# Patient Record
Sex: MALE | Race: OTHER | HISPANIC OR LATINO | URBAN - METROPOLITAN AREA
[De-identification: names, ages, dates, MRNs, and addresses within clinical notes are randomized per-mention and may not be internally consistent; named-entity substitution may affect disease eponyms.]

---

## 2019-07-30 ENCOUNTER — EMERGENCY (EMERGENCY)
Facility: HOSPITAL | Age: 24
LOS: 1 days | Discharge: HOME | End: 2019-07-30
Attending: EMERGENCY MEDICINE
Payer: MEDICAID

## 2019-07-30 VITALS
RESPIRATION RATE: 18 BRPM | WEIGHT: 218.04 LBS | OXYGEN SATURATION: 98 % | DIASTOLIC BLOOD PRESSURE: 89 MMHG | SYSTOLIC BLOOD PRESSURE: 139 MMHG | HEART RATE: 77 BPM | HEIGHT: 73 IN | TEMPERATURE: 98 F

## 2019-07-30 VITALS
SYSTOLIC BLOOD PRESSURE: 143 MMHG | DIASTOLIC BLOOD PRESSURE: 87 MMHG | OXYGEN SATURATION: 97 % | TEMPERATURE: 98 F | HEART RATE: 70 BPM | RESPIRATION RATE: 18 BRPM

## 2019-07-30 PROCEDURE — 93010 ELECTROCARDIOGRAM REPORT: CPT

## 2019-07-30 PROCEDURE — 99284 EMERGENCY DEPT VISIT MOD MDM: CPT

## 2019-07-30 PROCEDURE — 93005 ELECTROCARDIOGRAM TRACING: CPT

## 2019-07-30 PROCEDURE — 71046 X-RAY EXAM CHEST 2 VIEWS: CPT

## 2019-07-30 PROCEDURE — 71046 X-RAY EXAM CHEST 2 VIEWS: CPT | Mod: 26

## 2019-07-30 PROCEDURE — 99283 EMERGENCY DEPT VISIT LOW MDM: CPT

## 2019-07-30 RX ORDER — IBUPROFEN 200 MG
600 TABLET ORAL ONCE
Refills: 0 | Status: COMPLETED | OUTPATIENT
Start: 2019-07-30 | End: 2019-07-30

## 2019-07-30 RX ORDER — ACETAMINOPHEN 500 MG
650 TABLET ORAL ONCE
Refills: 0 | Status: COMPLETED | OUTPATIENT
Start: 2019-07-30 | End: 2019-07-30

## 2019-07-30 RX ADMIN — Medication 650 MILLIGRAM(S): at 22:07

## 2019-07-30 RX ADMIN — Medication 600 MILLIGRAM(S): at 22:07

## 2019-07-30 NOTE — ED PROVIDER NOTE - SKIN, MLM
Skin normal color for race, warm, dry and intact. No evidence of rash. No swelling in the extremities.

## 2019-07-30 NOTE — ED PROVIDER NOTE - NSFOLLOWUPINSTRUCTIONS_ED_ALL_ED_FT
You were seen in the ED for chest pain. Your chest x-ray and EKG (heart test) did not reveal any concerning abnormalities.     It is not entirely clear what is causing your symptoms, however it does not appear to be anything immediately dangerous.     You may use Tylenol 650mg every 8 hours or Motrin 600mg every 8 hours as needed for pain. These are over the counter medications.     Return to the ED for worsening pain, trouble breathing or any other concerns.

## 2019-07-30 NOTE — ED ADULT NURSE NOTE - OBJECTIVE STATEMENT
Pt presents to ED with complaint of left sided chest soreness, radiating down to the left arm,  pt began at 6pm yesterday, squeezing in quality, precipitated with movement and breathing, reports subjective fever at home, breathing unlabored, symmetrical, no shortness of breath, no cough, no nausea vomiting or diarrhea.

## 2019-07-30 NOTE — ED PROVIDER NOTE - ATTENDING CONTRIBUTION TO CARE
Attending MD Lopez:  I personally have seen and examined this patient.  Resident note reviewed and agree on plan of care and except where noted.  See HPI, PE, and MDM for details.

## 2019-07-30 NOTE — ED PROVIDER NOTE - CLINICAL SUMMARY MEDICAL DECISION MAKING FREE TEXT BOX
Attending MD Lopez: 23M with left chest wall pain. EKG without acute ischemic changes, CXR wnl. PERC negative so do not suspect PE. Do not suspect perimyocarditis. Plan for PO NSAIDs and expectant management.

## 2019-07-30 NOTE — ED ADULT NURSE NOTE - NSIMPLEMENTINTERV_GEN_ALL_ED
Implemented All Universal Safety Interventions:  [place] to call system. Call bell, personal items and telephone within reach. Instruct patient to call for assistance. Room bathroom lighting operational. Non-slip footwear when patient is off stretcher. Physically safe environment: no spills, clutter or unnecessary equipment. Stretcher in lowest position, wheels locked, appropriate side rails in place.

## 2019-07-30 NOTE — ED PROVIDER NOTE - OBJECTIVE STATEMENT
22 y/o M with no significant pmhx and no significant pshx presents to the ED c/o left sided squeezing CP with radiation down the left arm that started yesterday around 6pm while at rest. 10/10 pain. Made worse with respiration and movement. No relieving factors. + Fever. Denies runny nose, cough, SOB, and abd pain. Denies recent travel.

## 2019-07-30 NOTE — ED ADULT TRIAGE NOTE - CHIEF COMPLAINT QUOTE
Patient c/o left upper thoracic pain radiating to left shoulder and upper arm. Patient stated pain increases with movement and breathing. Patient c/o also chills and upper airway congestion. Symptoms started yesterday at 6 pm.

## 2021-10-05 NOTE — ED PROVIDER NOTE - DISPOSITION TYPE
Bed: 24  Expected date:   Expected time:   Means of arrival:   Comments:  PATIENT IN [address] [place] [address], RN  10/05/21 7131 DISCHARGE